# Patient Record
Sex: FEMALE | Employment: FULL TIME | URBAN - METROPOLITAN AREA
[De-identification: names, ages, dates, MRNs, and addresses within clinical notes are randomized per-mention and may not be internally consistent; named-entity substitution may affect disease eponyms.]

---

## 2024-05-20 ENCOUNTER — APPOINTMENT (OUTPATIENT)
Dept: PREADMISSION TESTING | Facility: HOSPITAL | Age: 44
End: 2024-05-20
Payer: COMMERCIAL

## 2024-05-28 ENCOUNTER — PRE-ADMISSION TESTING (OUTPATIENT)
Dept: PREADMISSION TESTING | Facility: HOSPITAL | Age: 44
End: 2024-05-28
Payer: COMMERCIAL

## 2024-05-28 ENCOUNTER — LAB (OUTPATIENT)
Dept: LAB | Facility: LAB | Age: 44
End: 2024-05-28
Payer: COMMERCIAL

## 2024-05-28 VITALS
HEIGHT: 60 IN | SYSTOLIC BLOOD PRESSURE: 123 MMHG | DIASTOLIC BLOOD PRESSURE: 80 MMHG | BODY MASS INDEX: 43.84 KG/M2 | HEART RATE: 76 BPM | TEMPERATURE: 97.2 F | RESPIRATION RATE: 18 BRPM | WEIGHT: 223.3 LBS | OXYGEN SATURATION: 99 %

## 2024-05-28 DIAGNOSIS — Z01.818 PREOP TESTING: ICD-10-CM

## 2024-05-28 DIAGNOSIS — Z01.818 PREOP TESTING: Primary | ICD-10-CM

## 2024-05-28 LAB
ABO GROUP (TYPE) IN BLOOD: NORMAL
ANION GAP SERPL CALC-SCNC: 17 MMOL/L
ANTIBODY SCREEN: NORMAL
BASOPHILS # BLD AUTO: 0.03 X10*3/UL (ref 0–0.1)
BASOPHILS NFR BLD AUTO: 0.5 %
BUN SERPL-MCNC: 11 MG/DL (ref 8–25)
CALCIUM SERPL-MCNC: 9.5 MG/DL (ref 8.5–10.4)
CHLORIDE SERPL-SCNC: 109 MMOL/L (ref 97–107)
CO2 SERPL-SCNC: 18 MMOL/L (ref 24–31)
CREAT SERPL-MCNC: 0.7 MG/DL (ref 0.4–1.6)
EGFRCR SERPLBLD CKD-EPI 2021: >90 ML/MIN/1.73M*2
EOSINOPHIL # BLD AUTO: 0.1 X10*3/UL (ref 0–0.7)
EOSINOPHIL NFR BLD AUTO: 1.6 %
ERYTHROCYTE [DISTWIDTH] IN BLOOD BY AUTOMATED COUNT: 13.2 % (ref 11.5–14.5)
GLUCOSE SERPL-MCNC: 86 MG/DL (ref 65–99)
HCT VFR BLD AUTO: 37.6 % (ref 36–46)
HGB BLD-MCNC: 12.4 G/DL (ref 12–16)
IMM GRANULOCYTES # BLD AUTO: 0.01 X10*3/UL (ref 0–0.7)
IMM GRANULOCYTES NFR BLD AUTO: 0.2 % (ref 0–0.9)
LYMPHOCYTES # BLD AUTO: 1.91 X10*3/UL (ref 1.2–4.8)
LYMPHOCYTES NFR BLD AUTO: 31.4 %
MCH RBC QN AUTO: 29.1 PG (ref 26–34)
MCHC RBC AUTO-ENTMCNC: 33 G/DL (ref 32–36)
MCV RBC AUTO: 88 FL (ref 80–100)
MONOCYTES # BLD AUTO: 0.48 X10*3/UL (ref 0.1–1)
MONOCYTES NFR BLD AUTO: 7.9 %
NEUTROPHILS # BLD AUTO: 3.56 X10*3/UL (ref 1.2–7.7)
NEUTROPHILS NFR BLD AUTO: 58.4 %
NRBC BLD-RTO: 0 /100 WBCS (ref 0–0)
PLATELET # BLD AUTO: 176 X10*3/UL (ref 150–450)
POTASSIUM SERPL-SCNC: 4.6 MMOL/L (ref 3.4–5.1)
RBC # BLD AUTO: 4.26 X10*6/UL (ref 4–5.2)
RH FACTOR (ANTIGEN D): NORMAL
SODIUM SERPL-SCNC: 144 MMOL/L (ref 133–145)
WBC # BLD AUTO: 6.1 X10*3/UL (ref 4.4–11.3)

## 2024-05-28 PROCEDURE — 93005 ELECTROCARDIOGRAM TRACING: CPT

## 2024-05-28 PROCEDURE — 86850 RBC ANTIBODY SCREEN: CPT

## 2024-05-28 PROCEDURE — 80048 BASIC METABOLIC PNL TOTAL CA: CPT

## 2024-05-28 PROCEDURE — 86901 BLOOD TYPING SEROLOGIC RH(D): CPT

## 2024-05-28 PROCEDURE — 36415 COLL VENOUS BLD VENIPUNCTURE: CPT

## 2024-05-28 PROCEDURE — 86900 BLOOD TYPING SEROLOGIC ABO: CPT

## 2024-05-28 PROCEDURE — 85025 COMPLETE CBC W/AUTO DIFF WBC: CPT

## 2024-05-28 RX ORDER — TIZANIDINE 2 MG/1
TABLET ORAL
COMMUNITY
Start: 2024-02-09

## 2024-05-28 RX ORDER — LISINOPRIL 40 MG/1
40 TABLET ORAL
COMMUNITY
Start: 2024-01-10

## 2024-05-28 RX ORDER — FLUTICASONE PROPIONATE 50 MCG
1 SPRAY, SUSPENSION (ML) NASAL
COMMUNITY
Start: 2024-05-15

## 2024-05-28 ASSESSMENT — ENCOUNTER SYMPTOMS
CONSTITUTIONAL NEGATIVE: 1
ENDOCRINE NEGATIVE: 1
RESPIRATORY NEGATIVE: 1
EYES NEGATIVE: 1
HEMATOLOGIC/LYMPHATIC NEGATIVE: 1
GASTROINTESTINAL NEGATIVE: 1
MUSCULOSKELETAL NEGATIVE: 1
NEUROLOGICAL NEGATIVE: 1
CARDIOVASCULAR NEGATIVE: 1
PSYCHIATRIC NEGATIVE: 1

## 2024-05-28 ASSESSMENT — DUKE ACTIVITY SCORE INDEX (DASI)
CAN YOU WALK A BLOCK OR TWO ON LEVEL GROUND: YES
CAN YOU DO YARD WORK LIKE RAKING LEAVES, WEEDING OR PUSHING A MOWER: YES
TOTAL_SCORE: 58.2
CAN YOU DO MODERATE WORK AROUND THE HOUSE LIKE VACUUMING, SWEEPING FLOORS OR CARRYING GROCERIES: YES
CAN YOU TAKE CARE OF YOURSELF (EAT, DRESS, BATHE, OR USE TOILET): YES
CAN YOU RUN A SHORT DISTANCE: YES
CAN YOU DO HEAVY WORK AROUND THE HOUSE LIKE SCRUBBING FLOORS OR LIFTING AND MOVING HEAVY FURNITURE: YES
CAN YOU PARTICIPATE IN MODERATE RECREATIONAL ACTIVITIES LIKE GOLF, BOWLING, DANCING, DOUBLES TENNIS OR THROWING A BASEBALL OR FOOTBALL: YES
CAN YOU HAVE SEXUAL RELATIONS: YES
CAN YOU PARTICIPATE IN STRENOUS SPORTS LIKE SWIMMING, SINGLES TENNIS, FOOTBALL, BASKETBALL, OR SKIING: YES
DASI METS SCORE: 9.9
CAN YOU CLIMB A FLIGHT OF STAIRS OR WALK UP A HILL: YES
CAN YOU DO LIGHT WORK AROUND THE HOUSE LIKE DUSTING OR WASHING DISHES: YES
CAN YOU WALK INDOORS, SUCH AS AROUND YOUR HOUSE: YES

## 2024-05-28 ASSESSMENT — PAIN - FUNCTIONAL ASSESSMENT: PAIN_FUNCTIONAL_ASSESSMENT: 0-10

## 2024-05-28 ASSESSMENT — PAIN SCALES - GENERAL: PAINLEVEL_OUTOF10: 9

## 2024-05-28 ASSESSMENT — PAIN DESCRIPTION - DESCRIPTORS: DESCRIPTORS: ACHING

## 2024-05-28 NOTE — PREPROCEDURE INSTRUCTIONS
Medication List            Accurate as of May 28, 2024 11:03 AM. Always use your most recent med list.                fluticasone 50 mcg/actuation nasal spray  Commonly known as: Flonase  Medication Adjustments for Surgery: Take morning of surgery with sip of water, no other fluids  Notes to patient: IF NEEDED     lisinopril 40 mg tablet  Medication Adjustments for Surgery: Other (Comment)  Notes to patient: HOLD THE DAY OF SURGERY     multivitamin with minerals tablet  Medication Adjustments for Surgery: Stop 7 days before surgery  Notes to patient: STOP TODAY UNTIL AFTER SURGERY     tiZANidine 2 mg tablet  Commonly known as: Zanaflex  Medication Adjustments for Surgery: Other (Comment)  Notes to patient: CAN TAKE THE NIGHT BEFORE SURGERY                 Preoperative Fasting Guidelines    Why must I stop eating and drinking near surgery time?  With sedation, food or liquid in your stomach can enter your lungs causing serious complications  Increases nausea and vomiting    When do I need to stop eating and drinking before my surgery?  Do not eat any food after midnight the night before your surgery/procedure.  You may have up to 13.5 ounces of clear liquid until TWO hours before your instructed arrival time to the hospital.  This includes water, black tea/coffee, (no milk or cream) apple juice, and electrolyte drinks (Gatorade)  You may chew gum until TWO hours before your surgery/procedure    PAT DISCHARGE INSTRUCTIONS    Please call the Same Day Surgery (SDS) Department of the hospital where your procedure will be performed after 2:00 PM the day before your surgery. If you are scheduled on a Monday, or a Tuesday following a Monday holiday, you will need to call on the last business day prior to your surgery.    [facility]  [address] [place] [address], [address] [phone]  Second Floor    [facility]  [address] [place]  [address]  [phone]  [facility]  [address] [place] [address]  [phone]    Please let your surgeon know if:      You develop any open sores, shingles, burning or painful urination as these may increase your risk of an infection.   You no longer wish to have the surgery.   Any other personal circumstances change that may lead to the need to cancel or defer this surgery-such as being sick or getting admitted to any hospital within one week of your planned procedure.    Your contact details change, such as a change of address or phone number.    Starting now:     Please DO NOT drink alcohol or smoke for 24 hours before surgery. It is well known that quitting smoking can make a huge difference to your health and recovery from surgery. The longer you abstain from smoking, the better your chances of a healthy recovery. If you need help with quitting, call 4-800QUIT-NOW to be connected to a trained counselor who will discuss the best methods to help you quit.     Before your surgery:    Please stop all supplements 7 days prior to surgery. Or as directed by your surgeon.   Please stop taking NSAID pain medicine such as Advil and Motrin 7 days before surgery.    If you develop any fever, cough, cold, rashes, cuts, scratches, scrapes, urinary symptoms or infection anywhere on your body (including teeth and gums) prior to surgery, please call your surgeon’s office as soon as possible. This may require treatment to reduce the chance of cancellation on the day of surgery.    The day before your surgery:   DIET- Please follow the diet instructions at the top of your packet.   Get a good night’s rest.  Use the special soap for bathing if you have been instructed to use one.    Scheduled surgery times may change and you will be notified if this occurs - please check your personal voicemail for any updates.     On the morning of surgery:   Wear comfortable, loose  fitting clothes which open in the front. Please do not wear moisturizers, creams, lotions, makeup or perfume.    Please bring with you to surgery:   Photo ID and insurance card   Current list of medicines and allergies   Pacemaker/ Defibrillator/Heart stent cards   CPAP machine and mask    Slings/ splints/ crutches   A copy of your complete advanced directive/DHPOA.    Please do NOT bring with you to surgery:   All jewelry and valuables should be left at home.   Prosthetic devices such as contact lenses, hearing aids, dentures, eyelash extensions, hairpins and body piercings must be removed prior to going in to the surgical suite.    After outpatient surgery:   A responsible adult MUST accompany you at the time of discharge and stay with you for 24 hours after your surgery. You may NOT drive yourself home after surgery.    Do not drive, operate machinery, make critical decisions or do activities that require co-ordination or balance until after a night’s sleep.   Do not drink alcoholic beverages for 24 hours.   Instructions for resuming your medications will be provided by your surgeon.    CALL YOUR DOCTOR AFTER SURGERY IF YOU HAVE:     Chills and/or a fever of 101° F or higher.    Redness, swelling, pus or drainage from your surgical wound or a bad smell from the wound.    Lightheadedness, fainting or confusion.    Persistent vomiting (throwing up) and are not able to eat or drink for 12 hours.    Three or more loose, watery bowel movements in 24 hours (diarrhea).   Difficulty or pain while urinating( after non-urological surgery)    Pain and swelling in your legs, especially if it is only on one side.    Difficulty breathing or are breathing faster than normal.    Any new concerning symptoms.     Home Preoperative Antibacterial Shower    What is a home preoperative antibacterial shower?  This shower is a way of cleaning the skin with a germ killing solution before surgery. The solution contains chlorhexidine,  commonly known as CHG. CHG is a skin cleanser with germ killing ability. Let your doctor know if you are allergic to chlorhexidine.      Why do I need to take a preoperative antibacterial shower?  Skin is not sterile. It is best to try to make your skin as free of germs as possible before surgery. Proper cleansing with a germ killing soap before surgery can lower the number of germs on your skin. This helps to reduce the risk of infection at the surgical site. Following the instructions listed below will help you prepare your skin for surgery.    How do I use the solution?      Steps: Begin using your CHG soap THE NIGHT BEFORE AND THE MORNING OF SURGERY.  First, wash and rinse your hair using the CHG soap. Keep CHG away soap away from ear canals and eyes.  Rinse completely, do not condition. Hair extensions should be removed.  Wash your face with your normal soap and rinse.  Apply the CHG solution to a clean wet washcloth. Turn the water off or move away from the water spray to avoid premature rinsing of the CHG soap as you are applying.  Firmly lather your entire body from neck down. Do not use on face.  Pay special attention to the areas(s) where your incision(s) will be located unless they are on your face.  Avoid scrubbing your skin too hard.  The important point is to have the CHG soap sit on your skin for 3 minutes.  DO NOT wash with regular soap after you have used the CHG soap solution.  Pat yourself dry with a clean, freshly laundered towel.  DO NOT apply powders, deodorants or lotions.  Dress in clean, freshly laundered night clothes.  Be sure to sleep with clean, freshly laundered sheets.  Be aware that CHG will cause stains on fabrics; if you wash them with bleach after use. Rinse your washcloth and other linens that have contact with CHG completely. Use only non-chlorine detergents to launder the items used.  The morning of surgery is the fifth day. Repeat the above steps and dress in clean comfortable  clothing.      Who should I call if I have any questions regarding the use of CHG soap?  Call the [facility] at [phone] if you have any questions.

## 2024-05-28 NOTE — CPM/PAT H&P
CPM/PAT Evaluation       Name: Jacklyn Johnson (Jacklyn Johnson)  /Age: 1980/43 y.o.     In-Person       Chief Complaint: Abnormal menstrual period    HPI    Pt is a 43 year old female with heavy menstrual periods and occasional pelvic pain. Pt stated her maternal Grandmother has history of ovarian cancer. Pt reports she had an endometrial ablation in the past that helped improve her heavy periods for several years. Over the past several months the patient has experienced increased menstrual bleeding. She stated her periods now last 7 days and all 7 days are heavy bleeding. She has also been experiencing occasional random pelvic pains. The patient reports she had a hysteroscopy and uterine polyps were found. Pt discussed options with her OBGYN and has been scheduled for robotic laparoscopy hysterectomy with bilateral salpingo-oophorectomy. Pt denies CP, SOB, or dizziness.     Past Medical History:   Diagnosis Date    Anemia     Chronic lower back pain     Hypertension     Morbid obesity (Multi)     Sleep apnea        Past Surgical History:   Procedure Laterality Date    BREAST BIOPSY      benign     SECTION, LOW TRANSVERSE      COLONOSCOPY      ENDOMETRIAL ABLATION      GASTRIC BYPASS      LIPOMA RESECTION      posterior neck    TUBAL LIGATION      UPPER GASTROINTESTINAL ENDOSCOPY       Social History     Tobacco Use    Smoking status: Former     Current packs/day: 0.00     Types: Cigarettes     Quit date: 2002     Years since quittin.0    Smokeless tobacco: Never   Substance Use Topics    Alcohol use: Yes     Comment: SOCIALLY     Social History     Substance and Sexual Activity   Drug Use Never     Patient Sexual activity questions deferred to the physician.    No family history on file.    Allergies   Allergen Reactions    Latex Rash     Added based on information entered during case entry, please review and add reactions, type, and severity as needed     Current Outpatient Medications    Medication Sig Dispense Refill    fluticasone (Flonase) 50 mcg/actuation nasal spray 1 spray by Does not apply route.      lisinopril 40 mg tablet Take 1 tablet (40 mg) by mouth once daily.      tiZANidine (Zanaflex) 2 mg tablet TAKE 1-2 TABLETS BY MOUTH EVERY 8 HOURS AS NEEDED (PAIN/SPASM).      multivitamin with minerals (multivitamin-iron-folic acid) tablet 1 tablet once daily.       No current facility-administered medications for this visit.     Review of Systems   Constitutional: Negative.    HENT: Negative.     Eyes: Negative.    Respiratory: Negative.     Cardiovascular: Negative.    Gastrointestinal: Negative.    Endocrine: Negative.    Genitourinary:  Positive for menstrual problem and pelvic pain.   Musculoskeletal: Negative.    Skin: Negative.    Neurological: Negative.    Hematological: Negative.    Psychiatric/Behavioral: Negative.       /80   Pulse 76   Temp 36.2 °C (97.2 °F) (Temporal)   Resp 18   Ht 1.524 m (5')   Wt 101 kg (223 lb 4.8 oz)   LMP 04/26/2024   SpO2 99%   BMI 43.61 kg/m²     Physical Exam  Vitals reviewed.   Constitutional:       Appearance: She is morbidly obese.   HENT:      Head: Normocephalic and atraumatic.      Nose: Nose normal.      Mouth/Throat:      Mouth: Mucous membranes are moist.      Pharynx: Oropharynx is clear.   Eyes:      Extraocular Movements: Extraocular movements intact.      Conjunctiva/sclera: Conjunctivae normal.      Pupils: Pupils are equal, round, and reactive to light.   Cardiovascular:      Rate and Rhythm: Normal rate and regular rhythm.      Pulses: Normal pulses.      Heart sounds: Normal heart sounds.   Pulmonary:      Effort: Pulmonary effort is normal.      Breath sounds: Normal breath sounds.   Abdominal:      General: Bowel sounds are normal.      Palpations: Abdomen is soft.      Tenderness: There is no abdominal tenderness.   Musculoskeletal:         General: Normal range of motion.      Cervical back: Normal range of motion and  neck supple.      Comments: Right knee pain    Skin:     General: Skin is warm and dry.   Neurological:      General: No focal deficit present.      Mental Status: She is alert and oriented to person, place, and time. Mental status is at baseline.   Psychiatric:         Mood and Affect: Mood normal.         Behavior: Behavior normal.         Thought Content: Thought content normal.         Judgment: Judgment normal.        PAT AIRWAY:   Airway:     Mallampati::  III    TM distance::  >3 FB    Neck ROM::  Full  normal      ASA:  3   DASI: 58.2  METS: 9.9  CHADS: 2.8%  RCRI: 0.9%  STOP BANG: 3    Assessment and Plan:     Abnormal uterine bleeding, female pelvic pain: robotic laparoscopy hysterectomy with bilateral salpingo-oophorectomy and cystoscopy.  HTN: Pt is taking lisinopril.   TRICIA: Pt reports this resolved after weight loss with gastric bypass surgery; pt denies having a repeat sleep study.   BMI: 43.6  Chronic lumbar back pain: pt reports she has a cyst in her spine located at L4-L5; Pt is being followed by a specialist for future surgery.     CBC, BMP, and T&S completed in PAT.  EKG completed in PAT.    MITZY Saez      Confirmed EKG can be found scanned into [employer] on 5/28/2024 under heading physician order.     MITZY Saez

## 2024-05-28 NOTE — H&P (VIEW-ONLY)
CPM/PAT Evaluation       Name: Jacklyn Johnson (Jacklyn Johnson)  /Age: 1980/43 y.o.     In-Person       Chief Complaint: Abnormal menstrual period    HPI    Pt is a 43 year old female with heavy menstrual periods and occasional pelvic pain. Pt stated her maternal Grandmother has history of ovarian cancer. Pt reports she had an endometrial ablation in the past that helped improve her heavy periods for several years. Over the past several months the patient has experienced increased menstrual bleeding. She stated her periods now last 7 days and all 7 days are heavy bleeding. She has also been experiencing occasional random pelvic pains. The patient reports she had a hysteroscopy and uterine polyps were found. Pt discussed options with her OBGYN and has been scheduled for robotic laparoscopy hysterectomy with bilateral salpingo-oophorectomy. Pt denies CP, SOB, or dizziness.     Past Medical History:   Diagnosis Date    Anemia     Chronic lower back pain     Hypertension     Morbid obesity (Multi)     Sleep apnea        Past Surgical History:   Procedure Laterality Date    BREAST BIOPSY      benign     SECTION, LOW TRANSVERSE      COLONOSCOPY      ENDOMETRIAL ABLATION      GASTRIC BYPASS      LIPOMA RESECTION      posterior neck    TUBAL LIGATION      UPPER GASTROINTESTINAL ENDOSCOPY       Social History     Tobacco Use    Smoking status: Former     Current packs/day: 0.00     Types: Cigarettes     Quit date: 2002     Years since quittin.0    Smokeless tobacco: Never   Substance Use Topics    Alcohol use: Yes     Comment: SOCIALLY     Social History     Substance and Sexual Activity   Drug Use Never     Patient Sexual activity questions deferred to the physician.    No family history on file.    Allergies   Allergen Reactions    Latex Rash     Added based on information entered during case entry, please review and add reactions, type, and severity as needed     Current Outpatient Medications    Medication Sig Dispense Refill    fluticasone (Flonase) 50 mcg/actuation nasal spray 1 spray by Does not apply route.      lisinopril 40 mg tablet Take 1 tablet (40 mg) by mouth once daily.      tiZANidine (Zanaflex) 2 mg tablet TAKE 1-2 TABLETS BY MOUTH EVERY 8 HOURS AS NEEDED (PAIN/SPASM).      multivitamin with minerals (multivitamin-iron-folic acid) tablet 1 tablet once daily.       No current facility-administered medications for this visit.     Review of Systems   Constitutional: Negative.    HENT: Negative.     Eyes: Negative.    Respiratory: Negative.     Cardiovascular: Negative.    Gastrointestinal: Negative.    Endocrine: Negative.    Genitourinary:  Positive for menstrual problem and pelvic pain.   Musculoskeletal: Negative.    Skin: Negative.    Neurological: Negative.    Hematological: Negative.    Psychiatric/Behavioral: Negative.       /80   Pulse 76   Temp 36.2 °C (97.2 °F) (Temporal)   Resp 18   Ht 1.524 m (5')   Wt 101 kg (223 lb 4.8 oz)   LMP 04/26/2024   SpO2 99%   BMI 43.61 kg/m²     Physical Exam  Vitals reviewed.   Constitutional:       Appearance: She is morbidly obese.   HENT:      Head: Normocephalic and atraumatic.      Nose: Nose normal.      Mouth/Throat:      Mouth: Mucous membranes are moist.      Pharynx: Oropharynx is clear.   Eyes:      Extraocular Movements: Extraocular movements intact.      Conjunctiva/sclera: Conjunctivae normal.      Pupils: Pupils are equal, round, and reactive to light.   Cardiovascular:      Rate and Rhythm: Normal rate and regular rhythm.      Pulses: Normal pulses.      Heart sounds: Normal heart sounds.   Pulmonary:      Effort: Pulmonary effort is normal.      Breath sounds: Normal breath sounds.   Abdominal:      General: Bowel sounds are normal.      Palpations: Abdomen is soft.      Tenderness: There is no abdominal tenderness.   Musculoskeletal:         General: Normal range of motion.      Cervical back: Normal range of motion and  neck supple.      Comments: Right knee pain    Skin:     General: Skin is warm and dry.   Neurological:      General: No focal deficit present.      Mental Status: She is alert and oriented to person, place, and time. Mental status is at baseline.   Psychiatric:         Mood and Affect: Mood normal.         Behavior: Behavior normal.         Thought Content: Thought content normal.         Judgment: Judgment normal.        PAT AIRWAY:   Airway:     Mallampati::  III    TM distance::  >3 FB    Neck ROM::  Full  normal      ASA:  3   DASI: 58.2  METS: 9.9  CHADS: 2.8%  RCRI: 0.9%  STOP BANG: 3    Assessment and Plan:     Abnormal uterine bleeding, female pelvic pain: robotic laparoscopy hysterectomy with bilateral salpingo-oophorectomy and cystoscopy.  HTN: Pt is taking lisinopril.   TRICIA: Pt reports this resolved after weight loss with gastric bypass surgery; pt denies having a repeat sleep study.   BMI: 43.6  Chronic lumbar back pain: pt reports she has a cyst in her spine located at L4-L5; Pt is being followed by a specialist for future surgery.     CBC, BMP, and T&S completed in PAT.  EKG completed in PAT.    MITZY Saez      Confirmed EKG can be found scanned into [employer] on 5/28/2024 under heading physician order.     MITZY Saez

## 2024-05-30 ENCOUNTER — PREP FOR PROCEDURE (OUTPATIENT)
Dept: OPERATING ROOM | Facility: HOSPITAL | Age: 44
End: 2024-05-30
Payer: COMMERCIAL

## 2024-05-30 RX ORDER — SODIUM CHLORIDE 9 MG/ML
100 INJECTION, SOLUTION INTRAVENOUS CONTINUOUS
Status: CANCELLED | OUTPATIENT
Start: 2024-06-04

## 2024-05-30 RX ORDER — GABAPENTIN 600 MG/1
600 TABLET ORAL ONCE
Status: CANCELLED | OUTPATIENT
Start: 2024-05-30 | End: 2024-05-30

## 2024-05-30 RX ORDER — CEFAZOLIN SODIUM 2 G/100ML
2 INJECTION, SOLUTION INTRAVENOUS ONCE
Status: CANCELLED | OUTPATIENT
Start: 2024-06-04 | End: 2024-05-30

## 2024-05-30 RX ORDER — CELECOXIB 200 MG/1
400 CAPSULE ORAL ONCE
Status: CANCELLED | OUTPATIENT
Start: 2024-05-30 | End: 2024-05-30

## 2024-05-30 RX ORDER — ACETAMINOPHEN 325 MG/1
975 TABLET ORAL ONCE
Status: CANCELLED | OUTPATIENT
Start: 2024-05-30 | End: 2024-05-30

## 2024-06-04 ENCOUNTER — ANESTHESIA EVENT (OUTPATIENT)
Dept: OPERATING ROOM | Facility: HOSPITAL | Age: 44
End: 2024-06-04
Payer: COMMERCIAL

## 2024-06-04 ENCOUNTER — ANESTHESIA (OUTPATIENT)
Dept: OPERATING ROOM | Facility: HOSPITAL | Age: 44
End: 2024-06-04
Payer: COMMERCIAL

## 2024-06-04 ENCOUNTER — HOSPITAL ENCOUNTER (OUTPATIENT)
Facility: HOSPITAL | Age: 44
Setting detail: OUTPATIENT SURGERY
Discharge: HOME | End: 2024-06-04
Attending: OBSTETRICS & GYNECOLOGY | Admitting: OBSTETRICS & GYNECOLOGY
Payer: COMMERCIAL

## 2024-06-04 ENCOUNTER — PHARMACY VISIT (OUTPATIENT)
Dept: PHARMACY | Facility: CLINIC | Age: 44
End: 2024-06-04
Payer: MEDICARE

## 2024-06-04 VITALS
SYSTOLIC BLOOD PRESSURE: 155 MMHG | RESPIRATION RATE: 18 BRPM | DIASTOLIC BLOOD PRESSURE: 95 MMHG | OXYGEN SATURATION: 97 % | TEMPERATURE: 97.7 F | HEART RATE: 96 BPM

## 2024-06-04 DIAGNOSIS — G89.18 POSTOPERATIVE PAIN: Primary | ICD-10-CM

## 2024-06-04 DIAGNOSIS — R10.2 FEMALE PELVIC PAIN: ICD-10-CM

## 2024-06-04 DIAGNOSIS — N93.9 ABNORMAL UTERINE BLEEDING: ICD-10-CM

## 2024-06-04 PROBLEM — G47.33 OSA (OBSTRUCTIVE SLEEP APNEA): Status: ACTIVE | Noted: 2024-06-04

## 2024-06-04 PROBLEM — E66.01 MORBID OBESITY (MULTI): Status: ACTIVE | Noted: 2024-06-04

## 2024-06-04 PROBLEM — I10 HTN (HYPERTENSION): Status: ACTIVE | Noted: 2024-06-04

## 2024-06-04 LAB
ABO GROUP (TYPE) IN BLOOD: NORMAL
GLUCOSE BLD MANUAL STRIP-MCNC: 89 MG/DL (ref 74–99)
PREGNANCY TEST URINE, POC: NEGATIVE
RH FACTOR (ANTIGEN D): NORMAL

## 2024-06-04 PROCEDURE — 3700000001 HC GENERAL ANESTHESIA TIME - INITIAL BASE CHARGE: Performed by: OBSTETRICS & GYNECOLOGY

## 2024-06-04 PROCEDURE — 2500000005 HC RX 250 GENERAL PHARMACY W/O HCPCS: Performed by: OBSTETRICS & GYNECOLOGY

## 2024-06-04 PROCEDURE — RXMED WILLOW AMBULATORY MEDICATION CHARGE

## 2024-06-04 PROCEDURE — 3600000017 HC OR TIME - EACH INCREMENTAL 1 MINUTE - PROCEDURE LEVEL SIX: Performed by: OBSTETRICS & GYNECOLOGY

## 2024-06-04 PROCEDURE — 3600000018 HC OR TIME - INITIAL BASE CHARGE - PROCEDURE LEVEL SIX: Performed by: OBSTETRICS & GYNECOLOGY

## 2024-06-04 PROCEDURE — 7100000010 HC PHASE TWO TIME - EACH INCREMENTAL 1 MINUTE: Performed by: OBSTETRICS & GYNECOLOGY

## 2024-06-04 PROCEDURE — 2500000004 HC RX 250 GENERAL PHARMACY W/ HCPCS (ALT 636 FOR OP/ED): Performed by: ANESTHESIOLOGY

## 2024-06-04 PROCEDURE — 2720000007 HC OR 272 NO HCPCS: Performed by: OBSTETRICS & GYNECOLOGY

## 2024-06-04 PROCEDURE — 81025 URINE PREGNANCY TEST: CPT | Performed by: OBSTETRICS & GYNECOLOGY

## 2024-06-04 PROCEDURE — 7100000001 HC RECOVERY ROOM TIME - INITIAL BASE CHARGE: Performed by: OBSTETRICS & GYNECOLOGY

## 2024-06-04 PROCEDURE — 2500000004 HC RX 250 GENERAL PHARMACY W/ HCPCS (ALT 636 FOR OP/ED): Mod: JZ | Performed by: OBSTETRICS & GYNECOLOGY

## 2024-06-04 PROCEDURE — 82947 ASSAY GLUCOSE BLOOD QUANT: CPT

## 2024-06-04 PROCEDURE — A4550 SURGICAL TRAYS: HCPCS | Performed by: OBSTETRICS & GYNECOLOGY

## 2024-06-04 PROCEDURE — 7100000002 HC RECOVERY ROOM TIME - EACH INCREMENTAL 1 MINUTE: Performed by: OBSTETRICS & GYNECOLOGY

## 2024-06-04 PROCEDURE — 36415 COLL VENOUS BLD VENIPUNCTURE: CPT | Performed by: OBSTETRICS & GYNECOLOGY

## 2024-06-04 PROCEDURE — 7100000009 HC PHASE TWO TIME - INITIAL BASE CHARGE: Performed by: OBSTETRICS & GYNECOLOGY

## 2024-06-04 PROCEDURE — 2500000001 HC RX 250 WO HCPCS SELF ADMINISTERED DRUGS (ALT 637 FOR MEDICARE OP): Performed by: ANESTHESIOLOGY

## 2024-06-04 PROCEDURE — 2500000001 HC RX 250 WO HCPCS SELF ADMINISTERED DRUGS (ALT 637 FOR MEDICARE OP): Performed by: OBSTETRICS & GYNECOLOGY

## 2024-06-04 PROCEDURE — 3700000002 HC GENERAL ANESTHESIA TIME - EACH INCREMENTAL 1 MINUTE: Performed by: OBSTETRICS & GYNECOLOGY

## 2024-06-04 PROCEDURE — 88307 TISSUE EXAM BY PATHOLOGIST: CPT | Mod: TC | Performed by: OBSTETRICS & GYNECOLOGY

## 2024-06-04 PROCEDURE — 2500000005 HC RX 250 GENERAL PHARMACY W/O HCPCS: Performed by: ANESTHESIOLOGIST ASSISTANT

## 2024-06-04 PROCEDURE — 2500000004 HC RX 250 GENERAL PHARMACY W/ HCPCS (ALT 636 FOR OP/ED): Performed by: ANESTHESIOLOGIST ASSISTANT

## 2024-06-04 RX ORDER — CELECOXIB 200 MG/1
400 CAPSULE ORAL ONCE
Status: COMPLETED | OUTPATIENT
Start: 2024-06-04 | End: 2024-06-04

## 2024-06-04 RX ORDER — LIDOCAINE HYDROCHLORIDE 10 MG/ML
INJECTION INFILTRATION; PERINEURAL AS NEEDED
Status: DISCONTINUED | OUTPATIENT
Start: 2024-06-04 | End: 2024-06-04

## 2024-06-04 RX ORDER — PROPOFOL 10 MG/ML
INJECTION, EMULSION INTRAVENOUS AS NEEDED
Status: DISCONTINUED | OUTPATIENT
Start: 2024-06-04 | End: 2024-06-04

## 2024-06-04 RX ORDER — IBUPROFEN 600 MG/1
600 TABLET ORAL EVERY 6 HOURS
Qty: 40 TABLET | Refills: 0 | Status: SHIPPED | OUTPATIENT
Start: 2024-06-04

## 2024-06-04 RX ORDER — ROCURONIUM BROMIDE 10 MG/ML
INJECTION, SOLUTION INTRAVENOUS AS NEEDED
Status: DISCONTINUED | OUTPATIENT
Start: 2024-06-04 | End: 2024-06-04

## 2024-06-04 RX ORDER — LABETALOL HYDROCHLORIDE 5 MG/ML
INJECTION, SOLUTION INTRAVENOUS AS NEEDED
Status: DISCONTINUED | OUTPATIENT
Start: 2024-06-04 | End: 2024-06-04

## 2024-06-04 RX ORDER — GABAPENTIN 600 MG/1
600 TABLET ORAL ONCE
Status: COMPLETED | OUTPATIENT
Start: 2024-06-04 | End: 2024-06-04

## 2024-06-04 RX ORDER — FENTANYL CITRATE 50 UG/ML
25 INJECTION, SOLUTION INTRAMUSCULAR; INTRAVENOUS EVERY 5 MIN PRN
Status: DISCONTINUED | OUTPATIENT
Start: 2024-06-04 | End: 2024-06-04 | Stop reason: HOSPADM

## 2024-06-04 RX ORDER — ONDANSETRON 4 MG/1
4 TABLET, FILM COATED ORAL EVERY 8 HOURS PRN
Qty: 10 TABLET | Refills: 0 | Status: SHIPPED | OUTPATIENT
Start: 2024-06-04

## 2024-06-04 RX ORDER — SODIUM CHLORIDE, SODIUM LACTATE, POTASSIUM CHLORIDE, CALCIUM CHLORIDE 600; 310; 30; 20 MG/100ML; MG/100ML; MG/100ML; MG/100ML
100 INJECTION, SOLUTION INTRAVENOUS CONTINUOUS
Status: DISCONTINUED | OUTPATIENT
Start: 2024-06-04 | End: 2024-06-04 | Stop reason: HOSPADM

## 2024-06-04 RX ORDER — BUPIVACAINE HYDROCHLORIDE 2.5 MG/ML
INJECTION, SOLUTION INFILTRATION; PERINEURAL AS NEEDED
Status: DISCONTINUED | OUTPATIENT
Start: 2024-06-04 | End: 2024-06-04 | Stop reason: HOSPADM

## 2024-06-04 RX ORDER — ONDANSETRON HYDROCHLORIDE 2 MG/ML
4 INJECTION, SOLUTION INTRAVENOUS ONCE AS NEEDED
Status: DISCONTINUED | OUTPATIENT
Start: 2024-06-04 | End: 2024-06-04 | Stop reason: HOSPADM

## 2024-06-04 RX ORDER — CEFAZOLIN SODIUM 2 G/100ML
2 INJECTION, SOLUTION INTRAVENOUS ONCE
Status: COMPLETED | OUTPATIENT
Start: 2024-06-04 | End: 2024-06-04

## 2024-06-04 RX ORDER — FENTANYL CITRATE 50 UG/ML
INJECTION, SOLUTION INTRAMUSCULAR; INTRAVENOUS AS NEEDED
Status: DISCONTINUED | OUTPATIENT
Start: 2024-06-04 | End: 2024-06-04

## 2024-06-04 RX ORDER — LIDOCAINE HYDROCHLORIDE 10 MG/ML
0.1 INJECTION INFILTRATION; PERINEURAL ONCE
Status: DISCONTINUED | OUTPATIENT
Start: 2024-06-04 | End: 2024-06-04 | Stop reason: HOSPADM

## 2024-06-04 RX ORDER — ACETAMINOPHEN 500 MG
1000 TABLET ORAL EVERY 6 HOURS PRN
Qty: 30 TABLET | Refills: 0 | Status: SHIPPED | OUTPATIENT
Start: 2024-06-04

## 2024-06-04 RX ORDER — SODIUM CHLORIDE, SODIUM LACTATE, POTASSIUM CHLORIDE, CALCIUM CHLORIDE 600; 310; 30; 20 MG/100ML; MG/100ML; MG/100ML; MG/100ML
INJECTION, SOLUTION INTRAVENOUS CONTINUOUS PRN
Status: DISCONTINUED | OUTPATIENT
Start: 2024-06-04 | End: 2024-06-04

## 2024-06-04 RX ORDER — ACETAMINOPHEN 325 MG/1
975 TABLET ORAL ONCE
Status: COMPLETED | OUTPATIENT
Start: 2024-06-04 | End: 2024-06-04

## 2024-06-04 RX ORDER — FENTANYL CITRATE 50 UG/ML
50 INJECTION, SOLUTION INTRAMUSCULAR; INTRAVENOUS EVERY 5 MIN PRN
Status: DISCONTINUED | OUTPATIENT
Start: 2024-06-04 | End: 2024-06-04 | Stop reason: HOSPADM

## 2024-06-04 RX ORDER — OXYCODONE HYDROCHLORIDE 5 MG/1
5 TABLET ORAL EVERY 6 HOURS PRN
Qty: 12 TABLET | Refills: 0 | Status: SHIPPED | OUTPATIENT
Start: 2024-06-04

## 2024-06-04 RX ORDER — MIDAZOLAM HYDROCHLORIDE 1 MG/ML
INJECTION, SOLUTION INTRAMUSCULAR; INTRAVENOUS AS NEEDED
Status: DISCONTINUED | OUTPATIENT
Start: 2024-06-04 | End: 2024-06-04

## 2024-06-04 RX ORDER — OXYCODONE HYDROCHLORIDE 5 MG/1
5 TABLET ORAL EVERY 4 HOURS PRN
Status: DISCONTINUED | OUTPATIENT
Start: 2024-06-04 | End: 2024-06-04 | Stop reason: HOSPADM

## 2024-06-04 RX ORDER — ONDANSETRON HYDROCHLORIDE 2 MG/ML
INJECTION, SOLUTION INTRAVENOUS AS NEEDED
Status: DISCONTINUED | OUTPATIENT
Start: 2024-06-04 | End: 2024-06-04

## 2024-06-04 RX ORDER — LABETALOL HYDROCHLORIDE 5 MG/ML
5 INJECTION, SOLUTION INTRAVENOUS ONCE AS NEEDED
Status: COMPLETED | OUTPATIENT
Start: 2024-06-04 | End: 2024-06-04

## 2024-06-04 RX ADMIN — ONDANSETRON HYDROCHLORIDE 4 MG: 2 INJECTION INTRAMUSCULAR; INTRAVENOUS at 09:18

## 2024-06-04 RX ADMIN — SUGAMMADEX 400 MG: 100 INJECTION, SOLUTION INTRAVENOUS at 09:22

## 2024-06-04 RX ADMIN — FENTANYL CITRATE 25 MCG: 50 INJECTION INTRAMUSCULAR; INTRAVENOUS at 10:18

## 2024-06-04 RX ADMIN — FENTANYL CITRATE 50 MCG: 0.05 INJECTION, SOLUTION INTRAMUSCULAR; INTRAVENOUS at 07:51

## 2024-06-04 RX ADMIN — GABAPENTIN 600 MG: 600 TABLET, FILM COATED ORAL at 07:03

## 2024-06-04 RX ADMIN — SODIUM CHLORIDE, POTASSIUM CHLORIDE, SODIUM LACTATE AND CALCIUM CHLORIDE: 600; 310; 30; 20 INJECTION, SOLUTION INTRAVENOUS at 07:43

## 2024-06-04 RX ADMIN — PROPOFOL 200 MG: 10 INJECTION, EMULSION INTRAVENOUS at 07:51

## 2024-06-04 RX ADMIN — FENTANYL CITRATE 50 MCG: 0.05 INJECTION, SOLUTION INTRAMUSCULAR; INTRAVENOUS at 08:13

## 2024-06-04 RX ADMIN — FENTANYL CITRATE 50 MCG: 0.05 INJECTION, SOLUTION INTRAMUSCULAR; INTRAVENOUS at 09:16

## 2024-06-04 RX ADMIN — CELECOXIB 400 MG: 200 CAPSULE ORAL at 07:03

## 2024-06-04 RX ADMIN — CEFAZOLIN SODIUM 2 G: 2 INJECTION, SOLUTION INTRAVENOUS at 07:58

## 2024-06-04 RX ADMIN — ACETAMINOPHEN 975 MG: 325 TABLET ORAL at 07:03

## 2024-06-04 RX ADMIN — ROCURONIUM BROMIDE 50 MG: 10 INJECTION, SOLUTION INTRAVENOUS at 07:51

## 2024-06-04 RX ADMIN — OXYCODONE 5 MG: 5 TABLET ORAL at 11:25

## 2024-06-04 RX ADMIN — ROCURONIUM BROMIDE 10 MG: 10 INJECTION, SOLUTION INTRAVENOUS at 08:43

## 2024-06-04 RX ADMIN — LABETALOL HYDROCHLORIDE 5 MG: 5 INJECTION, SOLUTION INTRAVENOUS at 08:34

## 2024-06-04 RX ADMIN — FENTANYL CITRATE 25 MCG: 50 INJECTION INTRAMUSCULAR; INTRAVENOUS at 10:10

## 2024-06-04 RX ADMIN — MIDAZOLAM 2 MG: 1 INJECTION INTRAMUSCULAR; INTRAVENOUS at 07:43

## 2024-06-04 RX ADMIN — LIDOCAINE HYDROCHLORIDE 5 ML: 10 INJECTION, SOLUTION INFILTRATION; PERINEURAL at 07:51

## 2024-06-04 RX ADMIN — ROCURONIUM BROMIDE 10 MG: 10 INJECTION, SOLUTION INTRAVENOUS at 08:19

## 2024-06-04 RX ADMIN — LABETALOL HYDROCHLORIDE 5 MG: 5 INJECTION INTRAVENOUS at 10:07

## 2024-06-04 RX ADMIN — DEXAMETHASONE SODIUM PHOSPHATE 8 MG: 4 INJECTION, SOLUTION INTRAMUSCULAR; INTRAVENOUS at 08:15

## 2024-06-04 ASSESSMENT — PAIN - FUNCTIONAL ASSESSMENT
PAIN_FUNCTIONAL_ASSESSMENT: 0-10
PAIN_FUNCTIONAL_ASSESSMENT: FLACC (FACE, LEGS, ACTIVITY, CRY, CONSOLABILITY)
PAIN_FUNCTIONAL_ASSESSMENT: 0-10

## 2024-06-04 ASSESSMENT — PAIN SCALES - GENERAL
PAINLEVEL_OUTOF10: 0 - NO PAIN
PAINLEVEL_OUTOF10: 5 - MODERATE PAIN
PAINLEVEL_OUTOF10: 6
PAINLEVEL_OUTOF10: 4
PAINLEVEL_OUTOF10: 5 - MODERATE PAIN
PAINLEVEL_OUTOF10: 0 - NO PAIN
PAINLEVEL_OUTOF10: 9
PAINLEVEL_OUTOF10: 4
PAINLEVEL_OUTOF10: 0 - NO PAIN
PAINLEVEL_OUTOF10: 0 - NO PAIN
PAINLEVEL_OUTOF10: 9

## 2024-06-04 ASSESSMENT — PAIN DESCRIPTION - DESCRIPTORS: DESCRIPTORS: CRAMPING

## 2024-06-04 ASSESSMENT — PAIN DESCRIPTION - LOCATION: LOCATION: ABDOMEN

## 2024-06-04 NOTE — ANESTHESIA POSTPROCEDURE EVALUATION
Patient: Jacklyn Johnson    Procedure Summary       Date: 24 Room / Location: [facility] OR  [facility]    Anesthesia Start: 744 Anesthesia Stop: 932    Procedure: Robotic Laparoscopy Hysterectomy with Bilateral Salpingo-Oophorectomy and Cystoscopy (Bilateral: Abdomen) Diagnosis:       Abnormal uterine bleeding      Female pelvic pain      Pelvic adhesive disease      Large uterus      Class 3 obesity (Multi)      History of  section      (Abnormal uterine bleeding [N93.9])      (Female pelvic pain [R10.2])    Surgeons: Habibeh M Gitiforooz, MD Responsible Provider: Yves Wilhelm MD    Anesthesia Type: general ASA Status: 3            Anesthesia Type: general    Vitals Value Taken Time   /100 24 0932   Temp 36.3 24 0932   Pulse 87 24 0932   Resp 16 24 0932   SpO2 100 24 0932       Anesthesia Post Evaluation    Patient location during evaluation: PACU  Patient participation: complete - patient participated  Level of consciousness: awake  Pain management: adequate  Airway patency: patent  Cardiovascular status: acceptable  Respiratory status: acceptable  Hydration status: acceptable  Postoperative Nausea and Vomiting: none        There were no known notable events for this encounter.

## 2024-06-04 NOTE — POST-PROCEDURE NOTE
1100:  Patient returned to Phase II alert and oriented.  IV intact and infusing with LR at KVO.  Patient is uncomfortable with complaints of cramping.  4 surgical sites with glue and steri strips intact.  Sites on each side of the abdomen re-enforced with band aids due to oozing of blood.  Patient denies nausea at this time.  She was given beverage and snack, tolerating without difficulty    1125:  Medicated for pain, warm blanket to abdomen given for comfort    1140:  Retail pharmacy called for home scripts    1145:  Home medication delivered to patient    1230:  Patient has been sleeping, no signs or symptoms of distress noted    1253:  Home going instructions reviewed with Patient's mom, no questions or concerns

## 2024-06-04 NOTE — DISCHARGE INSTRUCTIONS
POST-OPERATIVE INSTRUCTIONS    PAIN MANAGEMENT  Take your oral pain medications as directed.   You should take Ibuprofen 600mg tab every 6 hours along with Tylenol 1000mg every 6 hours. (Alternate them every 3 hours for full coverage)  If your pain is uncontrolled on the above medications, you may add narcotic medications such as Oxycodone 5mg every 6 hours for severe or uncontrolled pain  After 72 hours (3 days) you may decrease the Ibuprofen and tylenol to an as needed medication, however if you are still requiring use of the Oxycodone 5mg tab you should continue to take the Ibuprofen as scheduled.    BOWEL REGIMEN  Certain medications (such as nacrotics) can make you constipated, we dont want you to be bearing down or straining after surgery. Please take Colace (stool softener) two times per day and Miralax once per day to prevent constipation.  You may discontinue this if you have diarrhea.  Continue this medications if you are straining and having difficulty passing stool.   You may also take Milk of Magnesia or dulcolax suppositories for more severe constipation.     You do not need to wake up in the middle of the night to take medications if you are sleeping. You can reset your schedule when you wake up.     To help your bowels return to normal function, you can chew sugar less gum 2-3 times per day.   If you have nausea or have episodes of vomiting you have been prescribed Zofran 4mg under the tongue every 8 hours as needed. This medication can cause constipation.     ACTIVITY  No heavy lifting/pushing/pulling for 6-8 weeks.  Do not lift anything more than about 10 lbs (such as laundry, groceries, children, pets), vacuum, push heavy doors or grocery carts, etc.  You may climb stairs as tolerated.  Do not put anything in the vagina for 6-8 weeks after surgery unless otherwise instructed by your doctor (including tampons, douching, sexual intercourse, etc).    No driving for 1 week after surgery, while you  are taking narcotic pain medication, or until you feel that you are ready.   Avoid sitting or lying in bed for more than 2 hours at a time while you are awake to reduce your risk of blood clots.  You may return to work when directed by your physician.  Please contact your doctor if you need any return to work letters or medical leave paperwork to be completed.    WOUND CARE  You will have small incisions on your abdomen.  There will be dissolvable stitches under your skin that do not need to be removed.  You will have paper strips which will fall off or can be removed 7 days after surgery.  If you have a pressure dressing or large band-aid over your incision, you may remove this 24-48 hours after surgery.  Shower daily after surgery. Clean your incision with mild unscented soap and water.  Pat your incision dry with a clean towel.  No tub baths or pools until your wounds are completely healed.    Wash your hands frequently, especially before touching your incision, changing any dressings, after using the restroom, and before eating.      WHAT TO EXPECT AT HOME  Recovery from surgery is generally 6 weeks, but sometimes longer for more strenuous activity.  It is normal to be very tired during this time.    It is normal to have some vaginal drainage or a small amount of vaginal bleeding after surgery which may last up to 6 weeks. If you experience heavy vaginal bleeding call your doctor immediately.  You will most likely experience gas pain, abdominal swelling, or shoulder pain for 24-72 hours after surgery.  This is from the gas put into your abdomen to better visualize your organs.  A warm shower, heating pad, and/or walking may help.  You can place ice packs to the incision to help with pain and swelling.     WHEN TO CALL YOUR DOCTOR:  Fever (>100.4?F or 38.0?C) or chills.  Incision problems such as redness, warmth, swelling, or foul smelling drainage.  Severe nausea or persistent vomiting.  Bright red vaginal  bleeding (soaking >1 pad/hour) or foul smelling vaginal drainage.  Severe pain not relieved with pain medications.  Pain and swelling in your legs, especially if it is only on one side and not the other.  Pain with urination, cloudy urine, or foul smelling urine.  Or if you have any other problems or questions.    CALL 911 OR GO TO THE EMERGENCY ROOM IF YOU HAVE:  Any shortness of breath, difficulty breathing, or chest pain.      GYNECOLOGY PHYSICIAN CONTACT INFORMATION    Telephone: [phone]

## 2024-06-04 NOTE — ANESTHESIA PROCEDURE NOTES
Airway  Date/Time: 6/4/2024 7:54 AM  Urgency: elective    Airway not difficult    Staffing  Performed: SOTERO   Authorized by: Yves Wilhelm MD    Performed by: SOTERO Ruiz  Patient location during procedure: OR    Indications and Patient Condition  Indications for airway management: anesthesia  Spontaneous ventilation: present  Sedation level: deep  Preoxygenated: yes  Patient position: sniffing  Mask difficulty assessment: 1 - vent by mask    Final Airway Details  Final airway type: endotracheal airway      Successful airway: ETT  Cuffed: yes   Successful intubation technique: direct laryngoscopy  Facilitating devices/methods: intubating stylet  Endotracheal tube insertion site: oral  Blade: Lincoln  Blade size: #3  ETT size (mm): 7.0  Cormack-Lehane Classification: grade IIa - partial view of glottis  Placement verified by: capnometry   Cuff volume (mL): 7  Measured from: lips  ETT to lips (cm): 21  Number of attempts at approach: 1  Number of other approaches attempted: 0

## 2024-06-04 NOTE — OP NOTE
Robotic Laparoscopy Hysterectomy with Bilateral Salpingo-Oophorectomy and Cystoscopy (B) Operative Note     Date: 2024  OR Location: [facility]    Name: Jacklyn Johnson, : 1980, Age: 43 y.o., MRN: [id number], Sex: female    Diagnosis  Pre-op Diagnosis     * Abnormal uterine bleeding [N93.9]     * Female pelvic pain [R10.2]     * Class 3 obesity (Multi) [E66.01]     * History of  section [Z98.891] Post-op Diagnosis     * Abnormal uterine bleeding [N93.9]     * Female pelvic pain [R10.2]     * Pelvic adhesive disease [N73.6]     * Large uterus [N85.2]     * Class 3 obesity (Multi) [E66.01]     * History of  section [Z98.891]     Procedures  Robotic Laparoscopy Hysterectomy with Bilateral Salpingo-Oophorectomy and Cystoscopy  24634 - NV LAPS TOTAL HYSTERECT 250 GM/< W/RMVL TUBE/OVARY    Robotic Laparoscopy Hysterectomy with Bilateral Salpingo-Oophorectomy and Cystoscopy  95061 - [place] CYSTOURETHROSCOPY    Lysis of pelvic adhesions - additional 30 minutes added to scheduled surgery time    Surgeons      * Habibeh M Gitiforooz - Primary    Resident/Fellow/Other Assistant:  Surgeons and Role:     * Yaa South DO - Assisting    Procedure Summary  Anesthesia: * No anesthesia type entered *  ASA: III  Anesthesia Staff: Anesthesiologist: Yves Wilhelm MD  C-AA: SOTERO Ruiz  Estimated Blood Loss: 50mL  Intra-op Medications:   Administrations occurring from 0730 to 0945 on 24:   Medication Name Total Dose   BUPivacaine HCl (Marcaine) 0.25 % (2.5 mg/mL) injection 20 mL   ceFAZolin in dextrose (iso-os) (Ancef) IVPB 2 g 2 g              Anesthesia Record               Intraprocedure I/O Totals       None           Specimen:   ID Type Source Tests Collected by Time   1 : uterus, cervix, bilateral fallopian tubes and bilateral ovaries. Tissue UTERUS, CERVIX, FALLOPIAN TUBES AND OVARIES BILATERAL SURGICAL PATHOLOGY EXAM Habibeh M Gitiforooz, MD 2024 0834        Staff:   Circulator:  Vanessa  Circulator: Carmen  Scrub Person: Tez  Scrub Person: Veena      Findings:   - Examination under anesthesia confirmed a midline, fixed, enlarged uterus measuring approximately 10 weeks in size.   - Upon visualization, the uterus was large and globular with significant dense bladder adhesions to lower uterine segment. Bilateral fallopian tubes status post tubal ligation, appeared normal.  Bilateral ovaries appeared normal. The upper abdomen was visualized and appeared normal.  -Cystoscopy, the bladder was found to be free of stones, injury or sutures.  There was no evidence of trauma or bleeding.  The bladder dome was intact.  Brisk ureteral jets were seen bilaterally.    Specimen weight: 252g    Indications: Jacklyn Johnson is an 43 y.o. female who is having surgery for Abnormal uterine bleeding [N93.9]  Female pelvic pain [R10.2].     The patient was seen in the preoperative area. The risks, benefits, complications, treatment options, non-operative alternatives, expected recovery and outcomes were discussed with the patient. The possibilities of reaction to medication, pulmonary aspiration, injury to surrounding structures, bleeding, recurrent infection, the need for additional procedures, failure to diagnose a condition, and creating a complication requiring transfusion or operation were discussed with the patient. The patient concurred with the proposed plan, giving informed consent.  The site of surgery was properly noted/marked if necessary per policy. The patient has been actively warmed in preoperative area. Preoperative antibiotics have been ordered and given within 1 hours of incision. Venous thrombosis prophylaxis have been ordered including bilateral sequential compression devices    Procedure Details:   The patient was brought to the operating room on a stretcher and placed on the operating table in supine position.  General anesthesia was obtained without difficulty.  The patient was then brought  into the dorsal lithotomy position, and exam under anesthesia revealed those findings documented above. The patient was then prepped and draped in the usual sterile manner for a laparoscopic hysterectomy.  A time-out was performed and the patient's name, procedure, allergies, and preoperative medications were confirmed.    A Capone cathter was placed. Two retractors were inserted into the vagina. The cervix was grasped with a single-toothed tenaculum and the uterine manipulator device was then inserted into the uterine cavity without difficulty. The single-toothed tenaculum was removed and the device was secured.    Attention was then turned towards the abdomen. An Allis clamp was placed supra-umbilically and the skin was injected with 0.5% Marcaine. Using a #11 blade a supra-umbilical skin incision was made. Towel clamps were used to elevate the patient's pannus and a 8mm optical trocar was introduced into the abdomen. The abdomen was insufflated with carbon dioxide gas to a final pressure of 15 mm Hg. Atraumatic entry was confirmed. The patient was placed in steep Trendelenburg. Three additional ports were placed under direct visualization. One 8 mm port to the left of the midline, approximately 8 cm apart. Next, one 8 mm port was placed to the right of the midline, approximately 8cm apart and a 8 mm right sided assistant port was placed more laterally and inferior. The Xtiuminci robot was then docked, all ports were secured to the robotic arms, and robotic instruments were introduced under direct visualization.    Survey of the pelvis revealed those findings described above. Procedure was challenging due to pelvic adhesive disease and size of the uterus making manipulation of the uterus from side to side, as well as anterior to posterior challenging. Due to complexity of the case, Dr. South was present to assist with the surgery while I was working on the robotic .     The ureter course was identified  and outlined bilaterally. The left round ligament was serially coagulated and . The posterior leaf of the broad ligament was then incised parallel to the infundibulopelvic ligament and a window created in the avascular space of Graves. The left IP ligament was then desiccated and transected. The anterior and posterior leaves of the broad ligament were serially coagulated and . Creating the bladder flap was done carefully layer by layer given significant scarring to lower uterine segment from prior surgeries. This took an additional 30 minutes to restore normal anatomy and to be able to complete the procedure safely. Once the bladder flap was developed, the uterine vessels were identified, skeletonized, desiccated using bipolar forceps, and divided. These steps were repeated in identical fashion on the patient's right side. The uterine artery was dropped down to below the level of the colpotimizer cup. These steps were repeated in identical fashion on the patient's right side. A circumferential colpotomy was made. The uterus and cervix were removed from the vagina. A glove with a ray sherrill in it was placed in the vagina to maintain pneumoperitoneum. The vaginal cuff was then closed in a double-layer with 0 V-loc suture in a running fashion. Hemostasis was visualized. The cuff and all pedicles were irrigated and found to be hemostatic.     Next, the Acpone catheter was removed and cystoscopy using a 30 degree scope was then performed and bilateral ureteral jets were noted. A 360 degree view of the bladder revealed no evidence of intraoperative injury. The bladder was then emptied. The glove was removed from the vagina. Vaginal sweep was performed and found to be negative.     Attention was turned to the abdomen again. The robotic instruments were removed. The robot was undocked. The patient was taken out of Trendelenburg and once again, hemostasis observed. The ports were removed under direct  visualization. The abdomen was desufflated. The skin was closed with subcuticular sutures of 4-0 Monocryl and steri-strips.    All sponge and instrument counts were correct upon conclusion of the case. The patient was extubated and transferred to the recovery room in stable condition.    Complications:  None; patient tolerated the procedure well.    Disposition: PACU - hemodynamically stable.  Condition: stable     Habibeh M Gitiforooz  Phone Number: [phone]

## 2024-06-04 NOTE — ANESTHESIA PREPROCEDURE EVALUATION
Patient: Jacklyn Johnson    Procedure Information       Date/Time: 06/04/24 0730    Procedure: Robotic Laparoscopy Hysterectomy with Bilateral Salpingo-Oophorectomy and Cystoscopy (Bilateral)    Location: [facility] / [facility]    Surgeons: Habibeh M Gitiforooz, MD          Past Medical History:   Diagnosis Date    Anemia     Chronic lower back pain     Hypertension     Morbid obesity (Multi)     Sleep apnea         Relevant Problems   Cardiac   (+) HTN (hypertension)      Pulmonary   (+) TRICIA (obstructive sleep apnea)      Endocrine   (+) Morbid obesity (Multi)       Clinical information reviewed:   Tobacco  Allergies  Meds   Med Hx  Surg Hx  OB Status  Fam Hx  Soc   Hx        NPO Detail:  NPO/Void Status  Carbohydrate Drink Given Prior to Surgery? : Y  Date of Last Liquid: 06/03/24  Time of Last Liquid: 2300  Date of Last Solid: 06/03/24  Time of Last Solid: 1700  Last Intake Type: Carbohydrate drink  Time of Last Void: 0631         Physical Exam    Airway  Mallampati: III  TM distance: >3 FB  Neck ROM: full     Cardiovascular    Dental    Pulmonary    Abdominal            Anesthesia Plan    History of general anesthesia?: yes  History of complications of general anesthesia?: no    ASA 3     general     intravenous induction   Anesthetic plan and risks discussed with patient.    Plan discussed with CAA.

## 2024-06-04 NOTE — SIGNIFICANT EVENT
Bedside report to Vanda LACY to assume care  of the pt. All questions answered. Transferred to hospitals.  Status stable.

## 2024-06-05 LAB
LABORATORY COMMENT REPORT: NORMAL
PATH REPORT.FINAL DX SPEC: NORMAL
PATH REPORT.GROSS SPEC: NORMAL
PATH REPORT.RELEVANT HX SPEC: NORMAL
PATH REPORT.TOTAL CANCER: NORMAL

## (undated) DEVICE — GOWN, SURGICAL, SIRUS, NON REINFORCED, LARGE

## (undated) DEVICE — STAPLER, SKIN, PLUS, WIDE, 35

## (undated) DEVICE — SEAL, UNIVERSAL 5-8MM  XI

## (undated) DEVICE — SUTURE, MONOCRYL, 4-0, 27 IN, PS-2, UNDYED

## (undated) DEVICE — DRAPE, SHEET, LARGE, 70 X 85IN, STERILE

## (undated) DEVICE — TUBING, INSUFFLATION, W/ .3 MICRO FILTER & ADAPTER

## (undated) DEVICE — COVER, TIP HOT SHEARS ENDOWRIST

## (undated) DEVICE — GLOVE, SURGICAL, PROTEXIS PI , 6.5, PF, LF

## (undated) DEVICE — OBTURATOR, BLADELESS , SU

## (undated) DEVICE — TROCAR, OPTICAL, BLADELESS, 12MM, THREADED, 100MM LENGTH

## (undated) DEVICE — SUTURE, V-LOC, 2-0, 180 GR9 GS-21

## (undated) DEVICE — DRAPE, ARM XI

## (undated) DEVICE — SUTURE, VICRYL, 2-0, 27 IN, SH, UNDYED

## (undated) DEVICE — SUTURE, V-LOC, 180 0/0, GR9, GS21

## (undated) DEVICE — COVER, MAYO STAND, 23-1/2 X 56, LF

## (undated) DEVICE — SPONGE, LAP, X-RAY, RF DETECT, 18 X 18IN, STERILE

## (undated) DEVICE — CATHETER TRAY, URETHRAL, FOLEY, 16 FR, SILICONE

## (undated) DEVICE — MANIPULATOR, ADVINCULA DELINEATOR, 3.0CM

## (undated) DEVICE — Device

## (undated) DEVICE — SOLUTION, INJECTION, SODIUM CHLORIDE 9%, 3000ML

## (undated) DEVICE — GLOVE, SURGICAL, PROTEXIS PI , 7.0, PF, LF

## (undated) DEVICE — GLOVE, SURGICAL, PROTEXIS PI BLUE W/NEUTHERA, 6.5, PF, LF

## (undated) DEVICE — GOWN, SURGICAL, NON-REINFORCED, XLARGE, LF

## (undated) DEVICE — GLOVE, SURGICAL, PROTEXIS PI , 6.0, PF, LF

## (undated) DEVICE — LUBRICANT, ELECTROLUBE, F/ELECTRODE TIPS

## (undated) DEVICE — POSITIONING SYSTEM, PAGAZZI, PATIENT

## (undated) DEVICE — IRRIGATION SET, CYSTOSCOPY, F/CONSTANT/INTERMITTENT, 8 GTT/CC, 77 IN

## (undated) DEVICE — NEEDLE, HYPODERMIC, MONOJECT, 22 G X 1.5 IN, BLUE, RIGID PACK

## (undated) DEVICE — IRRIGATOR, WOUND, HYDRO SURG PLUS, W/O TIP, DISP